# Patient Record
Sex: MALE | Race: OTHER | HISPANIC OR LATINO | Employment: UNEMPLOYED | URBAN - METROPOLITAN AREA
[De-identification: names, ages, dates, MRNs, and addresses within clinical notes are randomized per-mention and may not be internally consistent; named-entity substitution may affect disease eponyms.]

---

## 2018-03-26 ENCOUNTER — OFFICE VISIT (OUTPATIENT)
Dept: FAMILY MEDICINE CLINIC | Facility: CLINIC | Age: 63
End: 2018-03-26
Payer: COMMERCIAL

## 2018-03-26 VITALS
OXYGEN SATURATION: 97 % | TEMPERATURE: 96.7 F | SYSTOLIC BLOOD PRESSURE: 118 MMHG | HEART RATE: 92 BPM | DIASTOLIC BLOOD PRESSURE: 80 MMHG | WEIGHT: 208.38 LBS | BODY MASS INDEX: 34.72 KG/M2 | RESPIRATION RATE: 18 BRPM | HEIGHT: 65 IN

## 2018-03-26 DIAGNOSIS — M54.41 CHRONIC RIGHT-SIDED LOW BACK PAIN WITH BILATERAL SCIATICA: Primary | ICD-10-CM

## 2018-03-26 DIAGNOSIS — M54.42 CHRONIC RIGHT-SIDED LOW BACK PAIN WITH BILATERAL SCIATICA: Primary | ICD-10-CM

## 2018-03-26 DIAGNOSIS — G89.29 CHRONIC RIGHT-SIDED LOW BACK PAIN WITH BILATERAL SCIATICA: Primary | ICD-10-CM

## 2018-03-26 DIAGNOSIS — G89.29 CHRONIC LEFT HIP PAIN: ICD-10-CM

## 2018-03-26 DIAGNOSIS — M25.552 CHRONIC LEFT HIP PAIN: ICD-10-CM

## 2018-03-26 DIAGNOSIS — G89.4 CHRONIC PAIN DISORDER: ICD-10-CM

## 2018-03-26 DIAGNOSIS — E78.2 MIXED HYPERLIPIDEMIA: ICD-10-CM

## 2018-03-26 DIAGNOSIS — K21.9 GASTROESOPHAGEAL REFLUX DISEASE WITHOUT ESOPHAGITIS: ICD-10-CM

## 2018-03-26 PROBLEM — R53.82 CHRONIC FATIGUE: Status: ACTIVE | Noted: 2017-10-03

## 2018-03-26 PROBLEM — M54.40 CHRONIC RIGHT-SIDED LOW BACK PAIN WITH SCIATICA: Status: ACTIVE | Noted: 2017-10-03

## 2018-03-26 PROCEDURE — 99204 OFFICE O/P NEW MOD 45 MIN: CPT | Performed by: FAMILY MEDICINE

## 2018-03-26 RX ORDER — MELOXICAM 15 MG/1
1 TABLET ORAL
COMMUNITY
Start: 2012-01-31

## 2018-03-26 RX ORDER — FENOFIBRATE 160 MG/1
1 TABLET ORAL DAILY
COMMUNITY
Start: 2012-02-15

## 2018-03-26 RX ORDER — OMEPRAZOLE 20 MG/1
20 CAPSULE, DELAYED RELEASE ORAL DAILY
Qty: 90 CAPSULE | Refills: 1 | Status: SHIPPED | OUTPATIENT
Start: 2018-03-26 | End: 2019-03-26

## 2018-03-26 RX ORDER — OMEPRAZOLE 20 MG/1
20 CAPSULE, DELAYED RELEASE ORAL
COMMUNITY
Start: 2017-10-03 | End: 2018-03-26 | Stop reason: SDUPTHER

## 2018-03-26 RX ORDER — SIMVASTATIN 40 MG
TABLET ORAL
COMMUNITY
Start: 2011-12-31 | End: 2018-05-01 | Stop reason: SDUPTHER

## 2018-03-26 RX ORDER — OXYCODONE HYDROCHLORIDE 5 MG/1
TABLET ORAL
Qty: 30 TABLET | Refills: 0 | Status: SHIPPED | OUTPATIENT
Start: 2018-03-26

## 2018-03-26 RX ORDER — GABAPENTIN 300 MG/1
CAPSULE ORAL
COMMUNITY
Start: 2012-04-23

## 2018-03-26 RX ORDER — GABAPENTIN 600 MG/1
600 TABLET ORAL
COMMUNITY
Start: 2017-10-03 | End: 2018-10-03

## 2018-03-26 NOTE — ASSESSMENT & PLAN NOTE
Continue Simvastatin and Fenofibrate   Patient signed for prior medical records  If no BW done within last 6 months will order FLP

## 2018-03-26 NOTE — ASSESSMENT & PLAN NOTE
Restart Oxycodone 5 mg daily   Discussed at length narcotic policy  Patient is aware must  his script every month himself, needs to call the office 5 days prior to needing refills  If he takes more than prescribed, if he runs out before he should, looses the prescription or pills it will not be refilled early  Must fill in only one pharmacy and always the same pharmacy  Is subject to random UDS  If he takes more than prescribed he will no longer receive prescription from our office  He can be referred to Pm, PT and or psych at anytime     Discussed at length addicting potential of opioids and possible side effects

## 2018-03-26 NOTE — PROGRESS NOTES
Assessment/Plan:    Mixed hyperlipidemia  Continue Simvastatin and Fenofibrate   Patient signed for prior medical records  If no BW done within last 6 months will order FLP     Gastroesophageal reflux disease without esophagitis  Restart PPI    Chronic right-sided low back pain with sciatica  Restart Oxycodone 5 mg daily   Discussed at length narcotic policy  Patient is aware must  his script every month himself, needs to call the office 5 days prior to needing refills  If he takes more than prescribed, if he runs out before he should, looses the prescription or pills it will not be refilled early  Must fill in only one pharmacy and always the same pharmacy  Is subject to random UDS  If he takes more than prescribed he will no longer receive prescription from our office  He can be referred to Pm, PT and or psych at anytime  Discussed at length addicting potential of opioids and possible side effects          Problem List Items Addressed This Visit        Digestive    Gastroesophageal reflux disease without esophagitis     Restart PPI         Relevant Medications    omeprazole (PRILOSEC) 20 mg delayed release capsule       Other    Chronic pain disorder    Chronic right-sided low back pain with sciatica - Primary     Restart Oxycodone 5 mg daily   Discussed at length narcotic policy  Patient is aware must  his script every month himself, needs to call the office 5 days prior to needing refills  If he takes more than prescribed, if he runs out before he should, looses the prescription or pills it will not be refilled early  Must fill in only one pharmacy and always the same pharmacy  Is subject to random UDS  If he takes more than prescribed he will no longer receive prescription from our office  He can be referred to Pm, PT and or psych at anytime     Discussed at length addicting potential of opioids and possible side effects          Relevant Medications    oxyCODONE (ROXICODONE) 5 mg immediate release tablet    Other Relevant Orders    XR hip/pelv 2-3 vws left if performed    Mixed hyperlipidemia     Continue Simvastatin and Fenofibrate   Patient signed for prior medical records  If no BW done within last 6 months will order FLP          Relevant Medications    simvastatin (ZOCOR) 40 mg tablet    fenofibrate (TRIGLIDE) 160 MG tablet    Chronic left hip pain    Relevant Orders    XR hip/pelv 2-3 vws left if performed            Subjective:      Patient ID: Lelia Burgess is a 61 y o  male  60 yo  male recently moved back to the area from [place]  Patient complains of long standing neck and LBP with radiculopathy secondary to L4-L5 disc herniation and spondylolisthesis with radiculopathy  As per patient he was taking daily Percocet 5/325 which controls the pain  His pain is constant, radiated towards both shoulders, worse with movement, can reach 8/10 at its worse, improves with Oxycodone and rest  LBP constant, radiated towards both buttocks L>R and towards mid posterior thigh, described as burning, worse with bending down  Improves with Oxycodone and rest  10/10 at its worse  Denies numbness, weakness, incontinence  Has tingling of L leg and foot  The following portions of the patient's history were reviewed and updated as appropriate:   He  has no past medical history on file  He   Patient Active Problem List    Diagnosis Date Noted    Chronic left hip pain 03/26/2018    Chronic fatigue 10/03/2017    Chronic pain disorder 10/03/2017    Chronic right-sided low back pain with sciatica 10/03/2017    Gastroesophageal reflux disease without esophagitis 10/03/2017    Mixed hyperlipidemia 10/03/2017     He  has no past surgical history on file  His family history includes Cancer in his son; Multiple sclerosis in his son; No Known Problems in his father and mother  He  reports that he has quit smoking  He has never used smokeless tobacco  He reports that he drinks alcohol   He reports that he does not use drugs  Current Outpatient Prescriptions   Medication Sig Dispense Refill    fenofibrate (TRIGLIDE) 160 MG tablet Take 1 tablet by mouth daily      gabapentin (NEURONTIN) 300 mg capsule Take by mouth      gabapentin (NEURONTIN) 600 MG tablet Take 600 mg by mouth      meloxicam (MOBIC) 15 mg tablet Take 1 tablet by mouth      omeprazole (PRILOSEC) 20 mg delayed release capsule Take 20 mg by mouth      simvastatin (ZOCOR) 40 mg tablet Take by mouth      oxyCODONE (ROXICODONE) 5 mg immediate release tablet Take one daily 30 tablet 0     No current facility-administered medications for this visit       Review of Systems   Gastrointestinal: Positive for abdominal pain  Musculoskeletal: Positive for arthralgias, back pain, neck pain and neck stiffness  All other systems reviewed and are negative  Objective:      /80 (BP Location: Right arm, Patient Position: Sitting, Cuff Size: Large)   Pulse 92   Temp (!) 96 7 °F (35 9 °C) (Tympanic)   Resp 18   Ht 5' 5" (1 651 m)   Wt 94 5 kg (208 lb 6 oz)   SpO2 97%   BMI 34 68 kg/m²          Physical Exam   Constitutional: He is oriented to person, place, and time  He appears well-developed  HENT:   Head: Normocephalic  Right Ear: External ear normal    Left Ear: External ear normal    Nose: Nose normal    Mouth/Throat: Oropharynx is clear and moist    Eyes: Conjunctivae and EOM are normal  Pupils are equal, round, and reactive to light  Neck: Normal range of motion  Neck supple  No thyromegaly present  Cardiovascular: Normal rate, regular rhythm and normal heart sounds  Pulmonary/Chest: Effort normal and breath sounds normal    Abdominal: Soft  There is no tenderness  There is no rebound and no guarding  Musculoskeletal: Normal range of motion  He exhibits tenderness  Neurological: He is alert and oriented to person, place, and time  He has normal reflexes  Skin: Skin is dry     Psychiatric: He has a normal mood and affect  Nursing note and vitals reviewed

## 2018-04-02 ENCOUNTER — HOSPITAL ENCOUNTER (OUTPATIENT)
Dept: RADIOLOGY | Facility: HOSPITAL | Age: 63
Discharge: HOME | End: 2018-04-02
Payer: COMMERCIAL

## 2018-04-02 DIAGNOSIS — G89.29 CHRONIC RIGHT-SIDED LOW BACK PAIN WITH BILATERAL SCIATICA: ICD-10-CM

## 2018-04-02 DIAGNOSIS — M25.552 CHRONIC LEFT HIP PAIN: ICD-10-CM

## 2018-04-02 DIAGNOSIS — M54.42 CHRONIC RIGHT-SIDED LOW BACK PAIN WITH BILATERAL SCIATICA: ICD-10-CM

## 2018-04-02 DIAGNOSIS — G89.29 CHRONIC LEFT HIP PAIN: ICD-10-CM

## 2018-04-02 DIAGNOSIS — M54.41 CHRONIC RIGHT-SIDED LOW BACK PAIN WITH BILATERAL SCIATICA: ICD-10-CM

## 2018-04-02 PROCEDURE — 73502 X-RAY EXAM HIP UNI 2-3 VIEWS: CPT

## 2018-05-01 ENCOUNTER — OFFICE VISIT (OUTPATIENT)
Dept: FAMILY MEDICINE CLINIC | Facility: CLINIC | Age: 63
End: 2018-05-01
Payer: COMMERCIAL

## 2018-05-01 VITALS
BODY MASS INDEX: 35.04 KG/M2 | OXYGEN SATURATION: 97 % | RESPIRATION RATE: 18 BRPM | HEART RATE: 90 BPM | SYSTOLIC BLOOD PRESSURE: 120 MMHG | WEIGHT: 210.31 LBS | DIASTOLIC BLOOD PRESSURE: 78 MMHG | TEMPERATURE: 98.1 F | HEIGHT: 65 IN

## 2018-05-01 DIAGNOSIS — G89.29 CHRONIC RIGHT-SIDED LOW BACK PAIN WITH BILATERAL SCIATICA: Primary | ICD-10-CM

## 2018-05-01 DIAGNOSIS — M25.552 CHRONIC LEFT HIP PAIN: ICD-10-CM

## 2018-05-01 DIAGNOSIS — M54.41 CHRONIC RIGHT-SIDED LOW BACK PAIN WITH BILATERAL SCIATICA: Primary | ICD-10-CM

## 2018-05-01 DIAGNOSIS — M54.42 CHRONIC RIGHT-SIDED LOW BACK PAIN WITH BILATERAL SCIATICA: ICD-10-CM

## 2018-05-01 DIAGNOSIS — Z12.11 COLON CANCER SCREENING: ICD-10-CM

## 2018-05-01 DIAGNOSIS — E78.2 MIXED HYPERLIPIDEMIA: ICD-10-CM

## 2018-05-01 DIAGNOSIS — M54.42 CHRONIC RIGHT-SIDED LOW BACK PAIN WITH BILATERAL SCIATICA: Primary | ICD-10-CM

## 2018-05-01 DIAGNOSIS — M54.41 CHRONIC RIGHT-SIDED LOW BACK PAIN WITH BILATERAL SCIATICA: ICD-10-CM

## 2018-05-01 DIAGNOSIS — G89.29 CHRONIC RIGHT-SIDED LOW BACK PAIN WITH BILATERAL SCIATICA: ICD-10-CM

## 2018-05-01 DIAGNOSIS — G89.29 CHRONIC LEFT HIP PAIN: ICD-10-CM

## 2018-05-01 PROCEDURE — 3008F BODY MASS INDEX DOCD: CPT | Performed by: FAMILY MEDICINE

## 2018-05-01 PROCEDURE — 3725F SCREEN DEPRESSION PERFORMED: CPT | Performed by: FAMILY MEDICINE

## 2018-05-01 PROCEDURE — 99214 OFFICE O/P EST MOD 30 MIN: CPT | Performed by: FAMILY MEDICINE

## 2018-05-01 PROCEDURE — 1036F TOBACCO NON-USER: CPT | Performed by: FAMILY MEDICINE

## 2018-05-01 RX ORDER — DICLOFENAC SODIUM 25 MG/1
25 TABLET, DELAYED RELEASE ORAL 2 TIMES DAILY
Qty: 60 TABLET | Refills: 1 | Status: SHIPPED | OUTPATIENT
Start: 2018-05-01 | End: 2018-05-01 | Stop reason: SDUPTHER

## 2018-05-01 RX ORDER — DICLOFENAC SODIUM 25 MG/1
TABLET, DELAYED RELEASE ORAL
Qty: 180 TABLET | Refills: 1 | Status: SHIPPED | OUTPATIENT
Start: 2018-05-01

## 2018-05-01 RX ORDER — SIMVASTATIN 40 MG
40 TABLET ORAL
Qty: 90 TABLET | Refills: 1 | Status: SHIPPED | OUTPATIENT
Start: 2018-05-01

## 2018-05-01 NOTE — PROGRESS NOTES
Assessment/Plan:    Mixed hyperlipidemia  Patient states he never received his Atorvastatin from the pharmacy  Atorvastatin sent again  Check FLP in 1 month  Discussed low fat diet     Chronic right-sided low back pain with sciatica  Given symptoms with Oxycodone will NOT refill  Start Diclofenac 25 mg BID warned to take with food  Check kidney function in 1 month  Still have not received prior records         Problem List Items Addressed This Visit        Other    Chronic left hip pain    Relevant Orders    Ambulatory referral to Orthopedic Surgery    Chronic right-sided low back pain with sciatica - Primary     Given symptoms with Oxycodone will NOT refill  Start Diclofenac 25 mg BID warned to take with food  Check kidney function in 1 month  Still have not received prior records         Relevant Orders    Ambulatory referral to Orthopedic Surgery    Mixed hyperlipidemia     Patient states he never received his Atorvastatin from the pharmacy  Atorvastatin sent again  Check FLP in 1 month  Discussed low fat diet          Relevant Medications    simvastatin (ZOCOR) 40 mg tablet    Other Relevant Orders    CBC and differential    Comprehensive metabolic panel    Lipid panel    Microalbumin / creatinine urine ratio    TSH, 3rd generation with T4 reflex      Other Visit Diagnoses     Colon cancer screening        Relevant Orders    Ambulatory Referral to GI Endoscopy            Subjective:      Patient ID: Stanley Riddle is a 61 y o  male  62 yo  male complains of constant LBP and L hip pain  Patient has been taking Oxycodone but states it only helps mildly with the pain  Pain is constant, worse with movement such as bending, twisting, standing  Pain can reach 7-8/10 at its worse  Radiated to his left posterior thigh, accompanied by tingling   When he takes Oxycodone patient states he feels a creeping sensation in his legs up to his abdomen and chest accompanied by dizziness which lasts for about 10 to 15 minutes  Hip pain is also constant, not radiated, worse with standing or walking  The following portions of the patient's history were reviewed and updated as appropriate:   He  has no past medical history on file  He   Patient Active Problem List    Diagnosis Date Noted    Chronic left hip pain 03/26/2018    Chronic fatigue 10/03/2017    Chronic pain disorder 10/03/2017    Chronic right-sided low back pain with sciatica 10/03/2017    Gastroesophageal reflux disease without esophagitis 10/03/2017    Mixed hyperlipidemia 10/03/2017     He  has no past surgical history on file  His family history includes Cancer in his son; Multiple sclerosis in his son; No Known Problems in his father and mother  He  reports that he has quit smoking  He has never used smokeless tobacco  He reports that he drinks alcohol  He reports that he does not use drugs  Current Outpatient Prescriptions   Medication Sig Dispense Refill    fenofibrate (TRIGLIDE) 160 MG tablet Take 1 tablet by mouth daily      gabapentin (NEURONTIN) 300 mg capsule Take by mouth      gabapentin (NEURONTIN) 600 MG tablet Take 600 mg by mouth      meloxicam (MOBIC) 15 mg tablet Take 1 tablet by mouth      omeprazole (PRILOSEC) 20 mg delayed release capsule Take 1 capsule (20 mg total) by mouth daily 90 capsule 1    oxyCODONE (ROXICODONE) 5 mg immediate release tablet Take one daily 30 tablet 0    simvastatin (ZOCOR) 40 mg tablet Take 1 tablet (40 mg total) by mouth daily at bedtime 90 tablet 1     No current facility-administered medications for this visit        Current Outpatient Prescriptions on File Prior to Visit   Medication Sig    fenofibrate (TRIGLIDE) 160 MG tablet Take 1 tablet by mouth daily    gabapentin (NEURONTIN) 300 mg capsule Take by mouth    gabapentin (NEURONTIN) 600 MG tablet Take 600 mg by mouth    meloxicam (MOBIC) 15 mg tablet Take 1 tablet by mouth    omeprazole (PRILOSEC) 20 mg delayed release capsule Take 1 capsule (20 mg total) by mouth daily    oxyCODONE (ROXICODONE) 5 mg immediate release tablet Take one daily    [DISCONTINUED] simvastatin (ZOCOR) 40 mg tablet Take by mouth     No current facility-administered medications on file prior to visit       Review of Systems   Musculoskeletal: Positive for arthralgias, back pain and gait problem  All other systems reviewed and are negative  Objective:      /78 (BP Location: Right arm, Patient Position: Sitting, Cuff Size: Large)   Pulse 90   Temp 98 1 °F (36 7 °C) (Tympanic)   Resp 18   Ht 5' 5" (1 651 m)   Wt 95 4 kg (210 lb 5 oz)   SpO2 97%   BMI 35 00 kg/m²          Physical Exam   Constitutional: He is oriented to person, place, and time  He appears well-developed  HENT:   Head: Normocephalic  Right Ear: External ear normal    Left Ear: External ear normal    Nose: Nose normal    Mouth/Throat: Oropharynx is clear and moist    Eyes: Conjunctivae and EOM are normal  Pupils are equal, round, and reactive to light  Neck: Normal range of motion  Neck supple  No thyromegaly present  Cardiovascular: Normal rate, regular rhythm and normal heart sounds  Pulmonary/Chest: Effort normal and breath sounds normal    Abdominal: Soft  There is no tenderness  There is no rebound and no guarding  Musculoskeletal: Normal range of motion  He exhibits tenderness  Neurological: He is alert and oriented to person, place, and time  He has normal reflexes  Skin: Skin is dry  Psychiatric: He has a normal mood and affect  Nursing note and vitals reviewed

## 2018-05-01 NOTE — ASSESSMENT & PLAN NOTE
Given symptoms with Oxycodone will NOT refill  Start Diclofenac 25 mg BID warned to take with food  Check kidney function in 1 month  Still have not received prior records

## 2018-05-01 NOTE — ASSESSMENT & PLAN NOTE
Patient states he never received his Atorvastatin from the pharmacy  Atorvastatin sent again  Check FLP in 1 month  Discussed low fat diet